# Patient Record
Sex: FEMALE | Race: WHITE | Employment: OTHER
[De-identification: names, ages, dates, MRNs, and addresses within clinical notes are randomized per-mention and may not be internally consistent; named-entity substitution may affect disease eponyms.]

---

## 2017-02-14 ENCOUNTER — RECORDS - HEALTHEAST (OUTPATIENT)
Dept: ADMINISTRATIVE | Facility: OTHER | Age: 82
End: 2017-02-14

## 2017-02-14 ENCOUNTER — OFFICE VISIT (OUTPATIENT)
Dept: DERMATOLOGY | Facility: CLINIC | Age: 82
End: 2017-02-14

## 2017-02-14 DIAGNOSIS — I87.2 STASIS DERMATITIS OF BOTH LEGS: ICD-10-CM

## 2017-02-14 DIAGNOSIS — L85.3 XEROSIS OF SKIN: ICD-10-CM

## 2017-02-14 DIAGNOSIS — L21.9 SEBORRHEIC DERMATITIS: Primary | ICD-10-CM

## 2017-02-14 DIAGNOSIS — Z85.828 HISTORY OF NONMELANOMA SKIN CANCER: ICD-10-CM

## 2017-02-14 RX ORDER — TRIAMCINOLONE ACETONIDE 1 MG/G
OINTMENT TOPICAL
COMMUNITY
Start: 2016-01-20

## 2017-02-14 RX ORDER — ECHINACEA 167 MG
TABLET ORAL
COMMUNITY
Start: 2015-12-16

## 2017-02-14 RX ORDER — ACETAMINOPHEN 160 MG
2000 TABLET,DISINTEGRATING ORAL
COMMUNITY
Start: 2015-01-02

## 2017-02-14 RX ORDER — LANOLIN ALCOHOL/MO/W.PET/CERES
3 CREAM (GRAM) TOPICAL
COMMUNITY
Start: 2015-12-16

## 2017-02-14 RX ORDER — FLUTICASONE PROPIONATE 50 MCG
SPRAY, SUSPENSION (ML) NASAL
COMMUNITY
Start: 2015-01-16

## 2017-02-14 RX ORDER — AMLODIPINE BESYLATE 10 MG/1
10 TABLET ORAL
COMMUNITY
Start: 2016-05-04

## 2017-02-14 RX ORDER — SIMETHICONE 125 MG
125 CAPSULE ORAL
COMMUNITY
Start: 2015-12-16

## 2017-02-14 RX ORDER — TRIAMCINOLONE ACETONIDE 1 MG/G
OINTMENT TOPICAL 2 TIMES DAILY
Qty: 80 G | Refills: 3 | Status: SHIPPED | OUTPATIENT
Start: 2017-02-14

## 2017-02-14 RX ORDER — KETOCONAZOLE 20 MG/ML
SHAMPOO TOPICAL
Qty: 120 ML | Refills: 11 | Status: SHIPPED | OUTPATIENT
Start: 2017-02-15

## 2017-02-14 ASSESSMENT — PAIN SCALES - GENERAL: PAINLEVEL: NO PAIN (0)

## 2017-02-14 NOTE — PATIENT INSTRUCTIONS
Understanding Chronic Venous Insufficiency  Problems with the veins in the legs may lead to chronic venous insufficiency (CVI). CVI means that there is a long-term problem with the veins not working well. Because of it, blood stays in the legs and causes swelling.   Two problems that may lead to chronic venous insufficiency are:    Damaged valves. Valves keep blood flowing from the legs through the blood vessels and back to the heart. When the valves are damaged, blood does not flow as well.     Deep vein thrombosis (DVT). Blood clots may form in the deep veins of the legs. This may cause pain, redness, and swelling in the legs. It may also block the flow of blood back to the heart. Call your health care provider if you have these symptoms.    A blood clot in the leg can also break off and travel to the lungs. This is called pulmonary embolism (PE). In the lungs, the clot can cut off the flow of blood. This may cause chest pain, trouble breathing, sweating, a fast heartbeat, coughing (may cough up blood), and fainting. It is a medical emergency and may cause death. Call 911 if you have these symptoms.    Health care providers call the 2 conditions, DVT and PE, venous thromboembolism (VTE).  CVI can t be cured, but you can control leg swelling to reduce the likelihood of ulcers (sores).  Recognizing the symptoms    If you stand or sit with your feet down for long periods, your legs may ache or feel heavy.    Swollen ankles are possibly the most common symptom of CVI.    As swelling increases, the skin over your ankles may show red spots or a brownish tinge. The skin may feel leathery or scaly, and may start to itch.    If swelling is not controlled, an ulcer (open wound) may form.  What you can do  Reduce your risk of developing ulcers by doing the following:    Increase blood flow back to your heart by elevating your legs, exercising daily, and wearing elastic stockings.    Boost blood flow in your legs by losing  excess weight.    If you must stand or sit in 1 place for a period of time, keep your blood moving by wiggling your toes, shifting your body position, and rising up on the balls of your feet.      1245-8517 [employer]. [address] [place] [address], [place] [address]. All rights reserved. This information is not intended as a substitute for professional medical care. Always follow your healthcare professional's instructions.      [employer] for compression stockings  Cetaphil wash for dry skin  Cerave, Vanicream, or Aveeno moisturizers after bathing

## 2017-02-14 NOTE — PROGRESS NOTES
"[facility] Dermatology Note      Dermatology Problem List:  1. Venous stasis dermatitis  2. Seborrheic Dermatitis  3. Rosacea  4. Raynaud's, possible  5. Hx of BCC, recurrent s/p excision 2015    Encounter Date: Feb 14, 2017    CC:  Chief Complaint   Patient presents with     Skin Check     Skin check, Vilma states \" Jo-Ann had skin cancers.\"         History of Present Illness:  Ms. Vilma Mosher is a 89 year old female who presents in self referral for dry skin and skin surveillance. Vilma Fontenot otherwise quite healthy but comes for evaluation of \"dry, thick\" skin over her hands and shins. She does not have any concerns for focal lesions, rashes, or moles. She states that her geriatrician recommended using Dove Gentle soaps at home. She states that she frequently washes her hands throughout the day for various ADLs and her skin becomes quite dry. Has some itching and dry skin over anterior shins. Also has complaint of dandruff and itching of scalp.    Past Medical History:   There is no problem list on file for this patient.    No past medical history on file.  No past surgical history on file.    Social History:  The patient is retired. The patient denies use of tanning beds.    Family History:  Family history is non-contributory.    Medications:  Current Outpatient Prescriptions   Medication Sig Dispense Refill     amLODIPine (NORVASC) 10 MG tablet Take 10 mg by mouth       Cholecalciferol (VITAMIN D3) 2000 UNITS CAPS Take 2,000 Units by mouth       fluticasone (FLONASE) 50 MCG/ACT spray Inhale 1 spray into each nostril once daily for allergies       Probiotic Product (SM ACIDOPHILUS) CAPS        melatonin 3 MG tablet Take 3 mg by mouth       Simethicone 125 MG CAPS Take 125 mg by mouth       polyethylene glycol 0.4%- propylene glycol 0.3% (SYSTANE ULTRA) 0.4-0.3 % SOLN ophthalmic solution one drop in both eye(s) 2-6 times daily       triamcinolone (KENALOG) 0.1 % ointment        Allergies "   Allergen Reactions     Codeine Nausea and Vomiting     Iodine Other (See Comments)     Very hard on her skin - skin breaks down.     Liquid Adhesive Other (See Comments)     Problem with Transpore tape.  Paper tape is OK, but be careful when taking off of skin.     Meperidine Nausea and Vomiting     Another name is Demerol.     Morphine Nausea and Vomiting     Oxycodone-Acetaminophen Nausea and Vomiting     Pneumococcal Vaccine Other (See Comments)     Prevnar 13         Review of Systems:  -As per HPI  -Constitutional: The patient denies fatigue, fevers, chills, unintended weight loss, and night sweats.  -HEENT: Patient denies nonhealing oral sores.  -Skin: As above in HPI. No additional skin concerns.    Physical exam:  Vitals: There were no vitals taken for this visit.  GEN: This is a well developed, well-nourished female in no acute distress, in a pleasant mood.    SKIN: Total skin excluding the undergarment areas was performed. The exam included the head/face, neck, both arms, chest, back, abdomen, both legs, digits and/or nails.   -Stasis dermatitis of LE bilaterally - pink, scaly patches amidst brown hyperpigmentation above the malleoli  -Dry skin diffusely over hands, legs, and posterior trunk  -mild scaling of scalp with erythema  -No other lesions of concern on areas examined.     Impression/Plan:  1. Venous Stasis Dermatitis    Triamcinolone 0.1% ointment BID PRN to dermatitis    Compression stockings while in dependent positions    2. Seborrheic dermatitis    Ketoconazole 2% shampoo 3x/week    Alternate every other day with H&S or Selsun Blue    3. Dry skin    Cerave, vanicream, or aveeno moisturizers after bathing    Cetaphil cleanser    See AVS for additional recs    4. History of nonmelanoma skin cancer (NMSC), last lesion identified and treated in 2015  - no evidence of recurrent disease via inspection or palpation; all sites well-healed  - photoprotection discussed (SPF30+ sunscreen and proper  use, UPF clothing, sun avoidance, tanning bed avoidance)  - continued skin surveillance recommended    Follow-up in 1 year, earlier for new or changing lesions.     Staff Involved:  Scribed by Nehemias Boyd, MS4 for Dr. Abarca.      Staff attestation:  The documentation recorded by the scribe accurately reflects the services I personally performed and the decisions I personally made.    Romain Abarca MD  Staff Dermatologist    Department of Dermatology

## 2017-02-14 NOTE — NURSING NOTE
"Dermatology Rooming Note    Vilma Mosher's goals for this visit include:   Chief Complaint   Patient presents with     Skin Check     Skin check, Vilma states \" Jo-Ann had skin cancers.\"     Kenia Mohamud LPN  "

## 2017-02-14 NOTE — LETTER
"2/14/2017       RE: Vilma Mosher  [address]  [address]  [place] [address]     Dear Colleague,    Thank you for referring your patient, Vilma Mosher, to the [facility] DERMATOLOGY at [facility]. Please see a copy of my visit note below.    [facility] Dermatology Note      Dermatology Problem List:  1. Venous stasis dermatitis  2. Seborrheic Dermatitis  3. Rosacea  4. Raynaud's, possible  5. Hx of BCC, recurrent s/p excision 2015    Encounter Date: Feb 14, 2017    CC:  Chief Complaint   Patient presents with     Skin Check     Skin check, Vilma states \" Jo-Ann had skin cancers.\"         History of Present Illness:  Ms. Vilma Mosher is a 89 year old female who presents in self referral for dry skin and skin surveillance. Vilma Fontenot otherwise quite healthy but comes for evaluation of \"dry, thick\" skin over her hands and shins. She does not have any concerns for focal lesions, rashes, or moles. She states that her geriatrician recommended using Dove Gentle soaps at home. She states that she frequently washes her hands throughout the day for various ADLs and her skin becomes quite dry. Has some itching and dry skin over anterior shins. Also has complaint of dandruff and itching of scalp.    Past Medical History:   There is no problem list on file for this patient.    No past medical history on file.  No past surgical history on file.    Social History:  The patient is retired. The patient denies use of tanning beds.    Family History:  Family history is non-contributory.    Medications:  Current Outpatient Prescriptions   Medication Sig Dispense Refill     amLODIPine (NORVASC) 10 MG tablet Take 10 mg by mouth       Cholecalciferol (VITAMIN D3) 2000 UNITS CAPS Take 2,000 Units by mouth       fluticasone (FLONASE) 50 MCG/ACT spray Inhale 1 spray into each nostril once daily for allergies       Probiotic Product (SM ACIDOPHILUS) CAPS        melatonin 3 MG " tablet Take 3 mg by mouth       Simethicone 125 MG CAPS Take 125 mg by mouth       polyethylene glycol 0.4%- propylene glycol 0.3% (SYSTANE ULTRA) 0.4-0.3 % SOLN ophthalmic solution one drop in both eye(s) 2-6 times daily       triamcinolone (KENALOG) 0.1 % ointment        Allergies   Allergen Reactions     Codeine Nausea and Vomiting     Iodine Other (See Comments)     Very hard on her skin - skin breaks down.     Liquid Adhesive Other (See Comments)     Problem with Transpore tape.  Paper tape is OK, but be careful when taking off of skin.     Meperidine Nausea and Vomiting     Another name is Demerol.     Morphine Nausea and Vomiting     Oxycodone-Acetaminophen Nausea and Vomiting     Pneumococcal Vaccine Other (See Comments)     Prevnar 13         Review of Systems:  -As per HPI  -Constitutional: The patient denies fatigue, fevers, chills, unintended weight loss, and night sweats.  -HEENT: Patient denies nonhealing oral sores.  -Skin: As above in HPI. No additional skin concerns.    Physical exam:  Vitals: There were no vitals taken for this visit.  GEN: This is a well developed, well-nourished female in no acute distress, in a pleasant mood.    SKIN: Total skin excluding the undergarment areas was performed. The exam included the head/face, neck, both arms, chest, back, abdomen, both legs, digits and/or nails.   -Stasis dermatitis of LE bilaterally - pink, scaly patches amidst brown hyperpigmentation above the malleoli  -Dry skin diffusely over hands, legs, and posterior trunk  -mild scaling of scalp with erythema  -No other lesions of concern on areas examined.     Impression/Plan:  1. Venous Stasis Dermatitis    Triamcinolone 0.1% ointment BID PRN to dermatitis    Compression stockings while in dependent positions    2. Seborrheic dermatitis    Ketoconazole 2% shampoo 3x/week    Alternate every other day with H&S or Selsun Blue    3. Dry skin    Cerave, vanicream, or aveeno moisturizers after  bathing    Cetaphil cleanser    See AVS for additional recs    4. History of nonmelanoma skin cancer (NMSC), last lesion identified and treated in 2015  - no evidence of recurrent disease via inspection or palpation; all sites well-healed  - photoprotection discussed (SPF30+ sunscreen and proper use, UPF clothing, sun avoidance, tanning bed avoidance)  - continued skin surveillance recommended    Follow-up in 1 year, earlier for new or changing lesions.     Staff Involved:  Scribed by Nehemias Boyd, MS4 for Dr. Abarca.      Staff attestation:  The documentation recorded by the scribe accurately reflects the services I personally performed and the decisions I personally made.    Romain Abarca MD  Staff Dermatologist    Department of Dermatology

## 2017-02-14 NOTE — MR AVS SNAPSHOT
After Visit Summary   2/14/2017    Vilma Mosher    MRN: [id number]           Patient Information     Date Of Birth          5/7/1927        Visit Information        Provider Department      2/14/2017 2:15 PM Romain Abarca MD [facility] Dermatology        Today's Diagnoses     Seborrheic dermatitis    -  1    Stasis dermatitis of both legs          Care Instructions      Understanding Chronic Venous Insufficiency  Problems with the veins in the legs may lead to chronic venous insufficiency (CVI). CVI means that there is a long-term problem with the veins not working well. Because of it, blood stays in the legs and causes swelling.   Two problems that may lead to chronic venous insufficiency are:    Damaged valves. Valves keep blood flowing from the legs through the blood vessels and back to the heart. When the valves are damaged, blood does not flow as well.     Deep vein thrombosis (DVT). Blood clots may form in the deep veins of the legs. This may cause pain, redness, and swelling in the legs. It may also block the flow of blood back to the heart. Call your health care provider if you have these symptoms.    A blood clot in the leg can also break off and travel to the lungs. This is called pulmonary embolism (PE). In the lungs, the clot can cut off the flow of blood. This may cause chest pain, trouble breathing, sweating, a fast heartbeat, coughing (may cough up blood), and fainting. It is a medical emergency and may cause death. Call 911 if you have these symptoms.    Health care providers call the 2 conditions, DVT and PE, venous thromboembolism (VTE).  CVI can t be cured, but you can control leg swelling to reduce the likelihood of ulcers (sores).  Recognizing the symptoms    If you stand or sit with your feet down for long periods, your legs may ache or feel heavy.    Swollen ankles are possibly the most common symptom of CVI.    As swelling increases, the skin over your ankles may show red  spots or a brownish tinge. The skin may feel leathery or scaly, and may start to itch.    If swelling is not controlled, an ulcer (open wound) may form.  What you can do  Reduce your risk of developing ulcers by doing the following:    Increase blood flow back to your heart by elevating your legs, exercising daily, and wearing elastic stockings.    Boost blood flow in your legs by losing excess weight.    If you must stand or sit in 1 place for a period of time, keep your blood moving by wiggling your toes, shifting your body position, and rising up on the balls of your feet.      4898-5758 [employer]. [address] [place] [address], [place] [address]. All rights reserved. This information is not intended as a substitute for professional medical care. Always follow your healthcare professional's instructions.      [employer] for compression stockings  Cetaphil wash for dry skin  Cerave, Vanicream, or Aveeno moisturizers after bathing        Follow-ups after your visit        Follow-up notes from your care team     Return in about 1 year (around 2/14/2018).      Who to contact     Please call your clinic at [phone] to:    Ask questions about your health    Make or cancel appointments    Discuss your medicines    Learn about your test results    Speak to your doctor   If you have compliments or concerns about an experience at your clinic, or if you wish to file a complaint, please contact [facility] Patient Relations at [phone] or email us at [email or web address]         Additional Information About Your Visit        [email or web address] Information     [employer] gives you secure access to your electronic health record. If you see a primary care provider, you can also send messages to your care team and make appointments. If you have questions, please call your primary care clinic.  If you do not have a primary care provider, please call [phone] and they will assist  you.      [employer] is an electronic gateway that provides easy, online access to your medical records. With [employer], you can request a clinic appointment, read your test results, renew a prescription or communicate with your care team.     To access your existing account, please contact your [facility] or call [phone] for assistance.        Care EveryWhere ID     This is your Care EveryWhere ID. This could be used by other organizations to access your [facility] medical records  [id number]         Blood Pressure from Last 3 Encounters:   No data found for BP    Weight from Last 3 Encounters:   No data found for Wt              Today, you had the following     No orders found for display         Today's Medication Changes          These changes are accurate as of: 2/14/17  3:30 PM.  If you have any questions, ask your nurse or doctor.               Start taking these medicines.        Dose/Directions    ketoconazole 2 % shampoo   Commonly known as:  NIZORAL   Used for:  Seborrheic dermatitis   Started by:  Romain Abarca MD        Start taking on:  2/15/2017   Apply topically three times a week   Quantity:  120 mL   Refills:  11         These medicines have changed or have updated prescriptions.        Dose/Directions    * triamcinolone 0.1 % ointment   Commonly known as:  KENALOG   This may have changed:  Another medication with the same name was added. Make sure you understand how and when to take each.   Changed by:  Romain Abarca MD        Refills:  0       * triamcinolone 0.1 % ointment   Commonly known as:  KENALOG   This may have changed:  You were already taking a medication with the same name, and this prescription was added. Make sure you understand how and when to take each.   Used for:  Stasis dermatitis of both legs   Changed by:  Romian Abarca MD        Apply topically 2 times daily   Quantity:  80 g   Refills:  3       * Notice:  This list has 2  medication(s) that are the same as other medications prescribed for you. Read the directions carefully, and ask your doctor or other care provider to review them with you.         Where to get your medicines      These medications were sent to [facility] [address] [facility]  [address] [place] [id number]     Phone:  [phone]     ketoconazole 2 % shampoo    triamcinolone 0.1 % ointment                Primary Care Provider Office Phone # Fax #    Ashlyn Avilez [phone]       [facility] [address]  [facility] [address]        Thank you!     Thank you for choosing [facility] DERMATOLOGY  for your care. Our goal is always to provide you with excellent care. Hearing back from our patients is one way we can continue to improve our services. Please take a few minutes to complete the written survey that you may receive in the mail after your visit with us. Thank you!             Your Updated Medication List - Protect others around you: Learn how to safely use, store and throw away your medicines at www.disposemymeds.org.          This list is accurate as of: 2/14/17  3:30 PM.  Always use your most recent med list.                   Brand Name Dispense Instructions for use    amLODIPine 10 MG tablet    NORVASC     Take 10 mg by mouth       fluticasone 50 MCG/ACT spray    FLONASE     Inhale 1 spray into each nostril once daily for allergies       ketoconazole 2 % shampoo   Start taking on:  2/15/2017    NIZORAL    120 mL    Apply topically three times a week       melatonin 3 MG tablet      Take 3 mg by mouth       Simethicone 125 MG Caps      Take 125 mg by mouth       SM ACIDOPHILUS Caps          SYSTANE ULTRA 0.4-0.3 % Soln ophthalmic solution   Generic drug:  polyethylene glycol 0.4%- propylene glycol 0.3%      one drop in both eye(s) 2-6 times daily       * triamcinolone 0.1 % ointment    KENALOG         * triamcinolone 0.1 % ointment    KENALOG    80 g    Apply  topically 2 times daily       vitamin D3 2000 UNITS Caps      Take 2,000 Units by mouth       * Notice:  This list has 2 medication(s) that are the same as other medications prescribed for you. Read the directions carefully, and ask your doctor or other care provider to review them with you.

## 2017-03-21 ENCOUNTER — DOCUMENTATION ONLY (OUTPATIENT)
Dept: OTHER | Facility: CLINIC | Age: 82
End: 2017-03-21

## 2017-03-21 DIAGNOSIS — Z71.89 ADVANCED DIRECTIVES, COUNSELING/DISCUSSION: Chronic | ICD-10-CM

## 2017-08-13 ENCOUNTER — OFFICE VISIT (OUTPATIENT)
Dept: URGENT CARE | Facility: URGENT CARE | Age: 82
End: 2017-08-13
Payer: COMMERCIAL

## 2017-08-13 VITALS
HEIGHT: 61 IN | BODY MASS INDEX: 19.45 KG/M2 | WEIGHT: 103 LBS | DIASTOLIC BLOOD PRESSURE: 60 MMHG | HEART RATE: 72 BPM | TEMPERATURE: 97.9 F | OXYGEN SATURATION: 100 % | SYSTOLIC BLOOD PRESSURE: 138 MMHG | RESPIRATION RATE: 15 BRPM

## 2017-08-13 DIAGNOSIS — H60.11 CELLULITIS OF TRAGUS OF RIGHT EAR: Primary | ICD-10-CM

## 2017-08-13 PROCEDURE — 99203 OFFICE O/P NEW LOW 30 MIN: CPT | Performed by: PHYSICIAN ASSISTANT

## 2017-08-13 RX ORDER — CEPHALEXIN 500 MG/1
500 CAPSULE ORAL 4 TIMES DAILY
Qty: 40 CAPSULE | Refills: 0 | Status: SHIPPED | OUTPATIENT
Start: 2017-08-13 | End: 2017-08-23

## 2017-08-13 ASSESSMENT — ENCOUNTER SYMPTOMS
CHILLS: 0
SHORTNESS OF BREATH: 0
DIARRHEA: 0
HEADACHES: 0
FEVER: 0
NAUSEA: 0
VOMITING: 0
FOCAL WEAKNESS: 0
ABDOMINAL PAIN: 0

## 2017-08-13 NOTE — MR AVS SNAPSHOT
After Visit Summary   8/13/2017    Vilma Mosher    MRN: [id number]           Patient Information     Date Of Birth          5/7/1927        Visit Information        Provider Department      8/13/2017 12:30 PM Shalonda Clifford PA-C [facility]        Today's Diagnoses     Cellulitis of tragus of right ear    -  1      Care Instructions    Follow up with primary care in 3-4 days if symptoms have not improved. Return to clinic or go to ER if symptoms worsen.      Discharge Instructions for Cellulitis  You have been diagnosed with cellulitis. This is an infection in the deepest layer of the skin. In some cases, the infection also affects the muscle. Cellulitis is caused by bacteria. The bacteria can enter the body through broken skin. This can happen with a cut, scratch, animal bite, or an insect bite that has been scratched. You may have been treated in the hospital with antibiotics and fluids. You will likely be given a prescription for antibiotics to take at home. This sheet will help you take care of yourself at home.  Home care  When you are home:    Take the prescribed antibiotic medicine you are given as directed until it is gone. Take it even if you feel better. It treats the infection and stops it from returning. Not taking all the medicine can make future infections hard to treat.    Keep the infected area clean.    When possible, raise the infected area above the level of your heart. This helps keep swelling down.    Talk with your healthcare provider if you are in pain. Ask what kind of over-the-counter medicine you can take for pain.    Apply clean bandages as advised.    Take your temperature once a day for a week.    Wash your hands often to prevent spreading the infection.  In the future, wash your hands before and after you touch cuts, scratches, or bandages. This will help prevent infection.   When to call your healthcare provider  Call your healthcare  provider immediately if you have any of the following:    Difficulty or pain when moving the joints above or below the infected area    Discharge or pus draining from the area    Fever of 100.4 F (38 C) or higher, or as directed by your healthcare provider    Pain that gets worse in or around the infected     Redness that gets worse in or around the infected area, particularly if the area of redness expands to a wider area    Shaking chills    Swelling of the infected area    Vomiting   Date Last Reviewed: 8/1/2016 2000-2017 [employer]. [address] [place] [address]. All rights reserved. This information is not intended as a substitute for professional medical care. Always follow your healthcare professional's instructions.                Follow-ups after your visit        Follow-up notes from your care team     Return if symptoms worsen or fail to improve.      Who to contact     If you have questions or need follow up information about today's clinic visit or your schedule please contact [facility] directly at [phone].  Normal or non-critical lab and imaging results will be communicated to you by [employer], letter or phone within 4 business days after the clinic has received the results. If you do not hear from us within 7 days, please contact the clinic through [employer] or phone. If you have a critical or abnormal lab result, we will notify you by phone as soon as possible.  Submit refill requests through [employer] or call your pharmacy and they will forward the refill request to us. Please allow 3 business days for your refill to be completed.          Additional Information About Your Visit        [email or web address] Information     [employer] gives you secure access to your electronic health record. If you see a primary care provider, you can also send messages to your care team and make appointments. If you have questions, please call your primary care clinic.  If you do  "not have a primary care provider, please call [phone] and they will assist you.        Care EveryWhere ID     This is your Care EveryWhere ID. This could be used by other organizations to access your [facility] medical records  [id number]        Your Vitals Were     Pulse Temperature Respirations Height Pulse Oximetry Breastfeeding?    72 97.9  F (36.6  C) (Oral) 15 5' 1\" (1.549 m) 100% No    BMI (Body Mass Index)                   19.46 kg/m2            Blood Pressure from Last 3 Encounters:   08/13/17 138/60    Weight from Last 3 Encounters:   08/13/17 103 lb (46.7 kg)              Today, you had the following     No orders found for display         Today's Medication Changes          These changes are accurate as of: 8/13/17  1:13 PM.  If you have any questions, ask your nurse or doctor.               Start taking these medicines.        Dose/Directions    cephALEXin 500 MG capsule   Commonly known as:  KEFLEX   Used for:  Cellulitis of tragus of right ear   Started by:  Shalonda Clifford PA-C        Dose:  500 mg   Take 1 capsule (500 mg) by mouth 4 times daily for 10 days   Quantity:  40 capsule   Refills:  0            Where to get your medicines      These medications were sent to [address] [address]     Phone:  [phone]     cephALEXin 500 MG capsule                Primary Care Provider Office Phone # Fax #    Ashlyn Elliottupa [phone]       [facility] [address] [facility] [address]        Equal Access to Services     [facility] AH: Hadii aad ku hadasho Soomaali, waaxda luqadaha, qaybta kaalmada adeegyada, lisa roca. So [facility] [phone].    ATENCIÓN: Si habla español, tiene a sanchez disposición servicios gratuitos de asistencia lingüística. Llame al [phone].    We comply with applicable federal civil rights laws and [place] laws. We do not discriminate on the basis " of race, color, national origin, age, disability sex, sexual orientation or gender identity.            Thank you!     Thank you for choosing [facility]  for your care. Our goal is always to provide you with excellent care. Hearing back from our patients is one way we can continue to improve our services. Please take a few minutes to complete the written survey that you may receive in the mail after your visit with us. Thank you!             Your Updated Medication List - Protect others around you: Learn how to safely use, store and throw away your medicines at www.disposemymeds.org.          This list is accurate as of: 8/13/17  1:13 PM.  Always use your most recent med list.                   Brand Name Dispense Instructions for use Diagnosis    amLODIPine 10 MG tablet    NORVASC     Take 10 mg by mouth        cephALEXin 500 MG capsule    KEFLEX    40 capsule    Take 1 capsule (500 mg) by mouth 4 times daily for 10 days    Cellulitis of tragus of right ear       fluticasone 50 MCG/ACT spray    FLONASE     Inhale 1 spray into each nostril once daily for allergies        ketoconazole 2 % shampoo    NIZORAL    120 mL    Apply topically three times a week    Seborrheic dermatitis       melatonin 3 MG tablet      Take 3 mg by mouth        Simethicone 125 MG Caps      Take 125 mg by mouth        SM ACIDOPHILUS Caps           SYSTANE ULTRA 0.4-0.3 % Soln ophthalmic solution   Generic drug:  polyethylene glycol 0.4%- propylene glycol 0.3%      one drop in both eye(s) 2-6 times daily        * triamcinolone 0.1 % ointment    KENALOG          * triamcinolone 0.1 % ointment    KENALOG    80 g    Apply topically 2 times daily    Stasis dermatitis of both legs       vitamin D3 2000 UNITS Caps      Take 2,000 Units by mouth        * Notice:  This list has 2 medication(s) that are the same as other medications prescribed for you. Read the directions carefully, and ask your doctor or other care provider to  review them with you.

## 2017-08-13 NOTE — PROGRESS NOTES
HPI  August 13, 2017    HPI: Vilma Mosher is a 89 year old female who complains of moderate tenderness, swelling, and erythema to R ear onset this AM. She does wear hearing aids. Symptoms are constant in duration. No treatments tried. Denies fever/chills, drainage, HA, CP, SOB, abd pain, N/V/D, or any other symptoms. Patient denies sick contacts.    No past medical history on file.  No past surgical history on file.  Social History   Substance Use Topics     Smoking status: Never Smoker     Smokeless tobacco: Not on file     Alcohol use Not on file     Patient Active Problem List   Diagnosis     Advance Care Planning     Family History   Problem Relation Age of Onset     Melanoma No family hx of      Skin Cancer No family hx of         Problem list, Medication list, Allergies, and Medical/Social/Surgical histories reviewed in [facility] and updated as appropriate.      Review of Systems   Constitutional: Negative for chills and fever.   HENT: Negative for ear discharge.    Respiratory: Negative for shortness of breath.    Cardiovascular: Negative for chest pain.   Gastrointestinal: Negative for abdominal pain, diarrhea, nausea and vomiting.   Skin: Negative for rash.        Tenderness/swellingn/erythema R ear   Neurological: Negative for focal weakness and headaches.   All other systems reviewed and are negative.      Physical Exam   Constitutional: She is oriented to person, place, and time and well-developed, well-nourished, and in no distress.   HENT:   Head: Normocephalic and atraumatic.   Right Ear: Tympanic membrane and ear canal normal. There is swelling (tragus) and tenderness (tragus). No drainage. No mastoid tenderness. Tympanic membrane is not erythematous.   Left Ear: Tympanic membrane, external ear and ear canal normal.   Nose: Nose normal.   Mild erythema, swelling, tenderness to tragus. No fluctuance   Cardiovascular: Normal rate, regular rhythm and normal heart sounds.    Pulmonary/Chest: Effort normal  "and breath sounds normal.   Musculoskeletal: Normal range of motion.   Neurological: She is alert and oriented to person, place, and time. Gait normal.   Skin: Skin is warm and dry.   Nursing note and vitals reviewed.      Vital Signs  /60  Pulse 72  Temp 97.9  F (36.6  C) (Oral)  Resp 15  Ht 5' 1\" (1.549 m)  Wt 103 lb (46.7 kg)  SpO2 100%  Breastfeeding? No  BMI 19.46 kg/m2     Diagnostic Test Results:  none     ASSESSMENT/PLAN      ICD-10-CM    1. Cellulitis of tragus of right ear H60.11 cephALEXin (KEFLEX) 500 MG capsule      C/w cellulitis, no abscess. Rx Keflex, advised warm compresses.       I have discussed any lab or imaging results, the patient's diagnosis, and my plan of treatment with the patient and/or family. Patient is aware to come back in if with worsening symptoms or if no relief despite treatment plan.  Patient voiced understanding and had no further questions.       Follow Up: Data Unavailable    LENO Schmitz, PA-C  [facility]      "

## 2017-08-13 NOTE — PATIENT INSTRUCTIONS
Follow up with primary care in 3-4 days if symptoms have not improved. Return to clinic or go to ER if symptoms worsen.      Discharge Instructions for Cellulitis  You have been diagnosed with cellulitis. This is an infection in the deepest layer of the skin. In some cases, the infection also affects the muscle. Cellulitis is caused by bacteria. The bacteria can enter the body through broken skin. This can happen with a cut, scratch, animal bite, or an insect bite that has been scratched. You may have been treated in the hospital with antibiotics and fluids. You will likely be given a prescription for antibiotics to take at home. This sheet will help you take care of yourself at home.  Home care  When you are home:    Take the prescribed antibiotic medicine you are given as directed until it is gone. Take it even if you feel better. It treats the infection and stops it from returning. Not taking all the medicine can make future infections hard to treat.    Keep the infected area clean.    When possible, raise the infected area above the level of your heart. This helps keep swelling down.    Talk with your healthcare provider if you are in pain. Ask what kind of over-the-counter medicine you can take for pain.    Apply clean bandages as advised.    Take your temperature once a day for a week.    Wash your hands often to prevent spreading the infection.  In the future, wash your hands before and after you touch cuts, scratches, or bandages. This will help prevent infection.   When to call your healthcare provider  Call your healthcare provider immediately if you have any of the following:    Difficulty or pain when moving the joints above or below the infected area    Discharge or pus draining from the area    Fever of 100.4 F (38 C) or higher, or as directed by your healthcare provider    Pain that gets worse in or around the infected     Redness that gets worse in or around the infected area, particularly if the  area of redness expands to a wider area    Shaking chills    Swelling of the infected area    Vomiting   Date Last Reviewed: 8/1/2016 2000-2017 [employer]. [address] [place] [address], [place] [address]. All rights reserved. This information is not intended as a substitute for professional medical care. Always follow your healthcare professional's instructions.

## 2017-08-13 NOTE — NURSING NOTE
"Chief Complaint   Patient presents with     Urgent Care     Derm Problem     ear is swollen in front of right ear, noticed it today. getting more painful        Initial /60  Pulse 72  Temp 97.9  F (36.6  C) (Oral)  Resp 15  Ht 5' 1\" (1.549 m)  Wt 103 lb (46.7 kg)  SpO2 100%  Breastfeeding? No  BMI 19.46 kg/m2 Estimated body mass index is 19.46 kg/(m^2) as calculated from the following:    Height as of this encounter: 5' 1\" (1.549 m).    Weight as of this encounter: 103 lb (46.7 kg).  Medication Reconciliation: complete  "

## 2019-10-03 ENCOUNTER — HEALTH MAINTENANCE LETTER (OUTPATIENT)
Age: 84
End: 2019-10-03

## 2019-12-16 ENCOUNTER — HEALTH MAINTENANCE LETTER (OUTPATIENT)
Age: 84
End: 2019-12-16

## 2021-01-15 ENCOUNTER — HEALTH MAINTENANCE LETTER (OUTPATIENT)
Age: 86
End: 2021-01-15

## 2021-09-05 ENCOUNTER — HEALTH MAINTENANCE LETTER (OUTPATIENT)
Age: 86
End: 2021-09-05

## 2022-02-20 ENCOUNTER — HEALTH MAINTENANCE LETTER (OUTPATIENT)
Age: 87
End: 2022-02-20

## 2022-10-23 ENCOUNTER — HEALTH MAINTENANCE LETTER (OUTPATIENT)
Age: 87
End: 2022-10-23

## 2023-04-02 ENCOUNTER — HEALTH MAINTENANCE LETTER (OUTPATIENT)
Age: 88
End: 2023-04-02

## 2024-06-17 PROBLEM — Z71.89 ADVANCED DIRECTIVES, COUNSELING/DISCUSSION: Status: RESOLVED | Noted: 2017-03-21 | Resolved: 2024-06-17
